# Patient Record
Sex: MALE | Race: WHITE | HISPANIC OR LATINO | URBAN - METROPOLITAN AREA
[De-identification: names, ages, dates, MRNs, and addresses within clinical notes are randomized per-mention and may not be internally consistent; named-entity substitution may affect disease eponyms.]

---

## 2019-04-08 ENCOUNTER — EMERGENCY (EMERGENCY)
Facility: HOSPITAL | Age: 34
LOS: 1 days | Discharge: HOME | End: 2019-04-08
Attending: EMERGENCY MEDICINE | Admitting: EMERGENCY MEDICINE
Payer: COMMERCIAL

## 2019-04-08 VITALS
SYSTOLIC BLOOD PRESSURE: 135 MMHG | TEMPERATURE: 98 F | OXYGEN SATURATION: 97 % | DIASTOLIC BLOOD PRESSURE: 107 MMHG | HEART RATE: 84 BPM | RESPIRATION RATE: 15 BRPM

## 2019-04-08 VITALS
HEIGHT: 71 IN | TEMPERATURE: 98 F | DIASTOLIC BLOOD PRESSURE: 84 MMHG | WEIGHT: 315 LBS | RESPIRATION RATE: 16 BRPM | SYSTOLIC BLOOD PRESSURE: 142 MMHG | HEART RATE: 90 BPM | OXYGEN SATURATION: 98 %

## 2019-04-08 LAB
ALBUMIN SERPL ELPH-MCNC: 4.2 G/DL — SIGNIFICANT CHANGE UP (ref 3.3–5)
ALP SERPL-CCNC: 66 U/L — SIGNIFICANT CHANGE UP (ref 40–120)
ALT FLD-CCNC: 116 U/L — HIGH (ref 4–41)
ANION GAP SERPL CALC-SCNC: 14 MMO/L — SIGNIFICANT CHANGE UP (ref 7–14)
AST SERPL-CCNC: 51 U/L — HIGH (ref 4–40)
BASOPHILS # BLD AUTO: 0.02 K/UL — SIGNIFICANT CHANGE UP (ref 0–0.2)
BASOPHILS NFR BLD AUTO: 0.2 % — SIGNIFICANT CHANGE UP (ref 0–2)
BILIRUB SERPL-MCNC: 0.5 MG/DL — SIGNIFICANT CHANGE UP (ref 0.2–1.2)
BUN SERPL-MCNC: 16 MG/DL — SIGNIFICANT CHANGE UP (ref 7–23)
CALCIUM SERPL-MCNC: 9.6 MG/DL — SIGNIFICANT CHANGE UP (ref 8.4–10.5)
CHLORIDE SERPL-SCNC: 103 MMOL/L — SIGNIFICANT CHANGE UP (ref 98–107)
CO2 SERPL-SCNC: 24 MMOL/L — SIGNIFICANT CHANGE UP (ref 22–31)
CREAT SERPL-MCNC: 0.91 MG/DL — SIGNIFICANT CHANGE UP (ref 0.5–1.3)
EOSINOPHIL # BLD AUTO: 0.15 K/UL — SIGNIFICANT CHANGE UP (ref 0–0.5)
EOSINOPHIL NFR BLD AUTO: 1.8 % — SIGNIFICANT CHANGE UP (ref 0–6)
GLUCOSE SERPL-MCNC: 99 MG/DL — SIGNIFICANT CHANGE UP (ref 70–99)
HCT VFR BLD CALC: 46.5 % — SIGNIFICANT CHANGE UP (ref 39–50)
HGB BLD-MCNC: 15.1 G/DL — SIGNIFICANT CHANGE UP (ref 13–17)
IMM GRANULOCYTES NFR BLD AUTO: 0.2 % — SIGNIFICANT CHANGE UP (ref 0–1.5)
LYMPHOCYTES # BLD AUTO: 2.83 K/UL — SIGNIFICANT CHANGE UP (ref 1–3.3)
LYMPHOCYTES # BLD AUTO: 33.4 % — SIGNIFICANT CHANGE UP (ref 13–44)
MCHC RBC-ENTMCNC: 27.1 PG — SIGNIFICANT CHANGE UP (ref 27–34)
MCHC RBC-ENTMCNC: 32.5 % — SIGNIFICANT CHANGE UP (ref 32–36)
MCV RBC AUTO: 83.5 FL — SIGNIFICANT CHANGE UP (ref 80–100)
MONOCYTES # BLD AUTO: 0.51 K/UL — SIGNIFICANT CHANGE UP (ref 0–0.9)
MONOCYTES NFR BLD AUTO: 6 % — SIGNIFICANT CHANGE UP (ref 2–14)
NEUTROPHILS # BLD AUTO: 4.95 K/UL — SIGNIFICANT CHANGE UP (ref 1.8–7.4)
NEUTROPHILS NFR BLD AUTO: 58.4 % — SIGNIFICANT CHANGE UP (ref 43–77)
NRBC # FLD: 0 K/UL — SIGNIFICANT CHANGE UP (ref 0–0)
PLATELET # BLD AUTO: 237 K/UL — SIGNIFICANT CHANGE UP (ref 150–400)
PMV BLD: 10.1 FL — SIGNIFICANT CHANGE UP (ref 7–13)
POTASSIUM SERPL-MCNC: 4.1 MMOL/L — SIGNIFICANT CHANGE UP (ref 3.5–5.3)
POTASSIUM SERPL-SCNC: 4.1 MMOL/L — SIGNIFICANT CHANGE UP (ref 3.5–5.3)
PROT SERPL-MCNC: 7.9 G/DL — SIGNIFICANT CHANGE UP (ref 6–8.3)
RBC # BLD: 5.57 M/UL — SIGNIFICANT CHANGE UP (ref 4.2–5.8)
RBC # FLD: 12.7 % — SIGNIFICANT CHANGE UP (ref 10.3–14.5)
SODIUM SERPL-SCNC: 141 MMOL/L — SIGNIFICANT CHANGE UP (ref 135–145)
TROPONIN T, HIGH SENSITIVITY: < 6 NG/L — SIGNIFICANT CHANGE UP (ref ?–14)
WBC # BLD: 8.48 K/UL — SIGNIFICANT CHANGE UP (ref 3.8–10.5)
WBC # FLD AUTO: 8.48 K/UL — SIGNIFICANT CHANGE UP (ref 3.8–10.5)

## 2019-04-08 PROCEDURE — 99284 EMERGENCY DEPT VISIT MOD MDM: CPT | Mod: 25

## 2019-04-08 PROCEDURE — 93010 ELECTROCARDIOGRAM REPORT: CPT | Mod: 59

## 2019-04-08 PROCEDURE — 73030 X-RAY EXAM OF SHOULDER: CPT | Mod: 26,LT

## 2019-04-08 PROCEDURE — 71046 X-RAY EXAM CHEST 2 VIEWS: CPT | Mod: 26

## 2019-04-08 RX ORDER — KETOROLAC TROMETHAMINE 30 MG/ML
15 SYRINGE (ML) INJECTION ONCE
Qty: 0 | Refills: 0 | Status: DISCONTINUED | OUTPATIENT
Start: 2019-04-08 | End: 2019-04-08

## 2019-04-08 RX ADMIN — Medication 15 MILLIGRAM(S): at 20:04

## 2019-04-08 NOTE — ED ADULT NURSE REASSESSMENT NOTE - NS ED NURSE REASSESS COMMENT FT1
Pt given Toradol for pain per MD orders.  Pain reassessed and pt states his pain is now at a 3/10. Will continue to monitor.

## 2019-04-08 NOTE — ED ADULT NURSE NOTE - NSIMPLEMENTINTERV_GEN_ALL_ED
Implemented All Universal Safety Interventions:  [place] to call system. Call bell, personal items and telephone within reach. Instruct patient to call for assistance. Room bathroom lighting operational. Non-slip footwear when patient is off stretcher. Physically safe environment: no spills, clutter or unnecessary equipment. Stretcher in lowest position, wheels locked, appropriate side rails in place.

## 2019-04-08 NOTE — ED PROVIDER NOTE - OBJECTIVE STATEMENT
34M presents to ED with left upper chest pain.  Pain has been constant since 2pm and radiates down left shoulder.  Patient describes pain as constant and dull in nature.  Patient reports he lifted something at home and felt a pop last week in his elbow and shoulder.  Pain from that resolved.  Patient denies dyspnea, nausea, vomiting, abdominal pain, fevers, chills, cough. 34M presents to ED with left upper chest pain.  Pain has been constant since 2pm and radiates down left shoulder.  Patient describes pain as constant and dull in nature.  Patient reports he lifted something at home and felt a pop last week in his elbow and shoulder.  Pain from that resolved.  Patient denies dyspnea, nausea, vomiting, abdominal pain, fevers, chills, cough. Pt states he has h/o one episode of A fib few years ago.

## 2019-04-08 NOTE — ED ADULT NURSE NOTE - OBJECTIVE STATEMENT
Pt received in room 15 AA&O X4. PMH of Afib. Comes in to ED today c/o pain originating in left arm and radiating to left shoulder and chest. Pt received in room 15 AA&O X4. PMH of Afib but has resolved. Comes in to ED today c/o pain originating in left arm and radiating to left shoulder, jaw and chest since 2PM today. Pt states he feels dizzy and has a headache. Pt denies any SOB at this time.  Pt hooked up to monitor. Labs collected. MD at bedside. Will continue to monitor. Pt received in room 15 AA&O X4. PMH of Afib but has resolved. Comes in to ED today c/o pain originating in left arm and radiating to left shoulder, jaw and chest since 2PM today. Pt states he feels dizzy and has a headache. Pt denies any SOB at this time. 20 G placed in RT hand. Pt hooked up to monitor. Labs collected. MD at bedside. Will continue to monitor.

## 2019-04-08 NOTE — ED PROVIDER NOTE - NSFOLLOWUPINSTRUCTIONS_ED_ALL_ED_FT
1) Please follow-up with your primary care doctor.  If you cannot follow-up with your doctor(s), please return to the ED for any urgent issues.  2) If you have any worsening of symptoms or any other concerns please return to the ED immediately.  3) Please continue taking your home medications as directed.  4) You may have been given a copy of your labs and/or imaging.  Please go over these with your primary care doctor.   5) Take 600mg Motrin (also called Advil or Ibuprofen) every 6 hours as needed for fever/pain/discomfort/swelling. You can take this with Tylenol 650 mg (also called acetaminophen) every 6 hours.   Don't use more than 3500mg of Tylenol in any 24-hour period. Make sure your other prescription/over-the-counter medications don't contain any Tylenol so you don't take too much.   If you have any stomach discomfort while taking Motrin, you can use TUMS or Pepcid or Zantac (these can all be bought without a prescription).

## 2019-04-08 NOTE — ED PROVIDER NOTE - ATTENDING CONTRIBUTION TO CARE
34M presents to ED with left upper chest pain.  Pain has been constant since 2pm and radiates down left shoulder.  Patient describes pain as constant and dull in nature.  Patient reports he lifted something at home and felt a pop last week in his elbow and shoulder.  Pain from that resolved.  Patient denies dyspnea, nausea, vomiting, abdominal pain, fevers, chills, cough. Pt states he has h/o one episode of A fib few years ago. On exam: VSS lungs, heart, pulses, abdomen, neuro, skin, all normal on exam. + tenderness to L upper arm muscles and L pectoralis which reproduces his presenting pain. EKG NSR No acute changes. IMP: Likely muscular L arm/CP Plan: Labs troponin NSAID, likely DC

## 2019-04-08 NOTE — ED ADULT TRIAGE NOTE - CHIEF COMPLAINT QUOTE
c/o reproducible left arm pain radiating into left upper chest, left neck and left jaw area along with feeling dizziness since 2pm today. Pt states 1 week ago pt did move something heavy and felt a "pop" in left shoulder but pain had gone away after a day. Hx Afib not on any anticoagulants.

## 2019-04-11 ENCOUNTER — OTHER (OUTPATIENT)
Age: 34
End: 2019-04-11

## 2019-04-11 ENCOUNTER — APPOINTMENT (OUTPATIENT)
Dept: ORTHOPEDIC SURGERY | Facility: CLINIC | Age: 34
End: 2019-04-11
Payer: COMMERCIAL

## 2019-04-11 VITALS
WEIGHT: 315 LBS | SYSTOLIC BLOOD PRESSURE: 144 MMHG | HEART RATE: 87 BPM | HEIGHT: 71 IN | DIASTOLIC BLOOD PRESSURE: 87 MMHG | BODY MASS INDEX: 44.1 KG/M2

## 2019-04-11 DIAGNOSIS — S46.212A STRAIN OF MUSCLE, FASCIA AND TENDON OF OTHER PARTS OF BICEPS, LEFT ARM, INITIAL ENCOUNTER: ICD-10-CM

## 2019-04-11 PROCEDURE — 99203 OFFICE O/P NEW LOW 30 MIN: CPT

## 2019-04-11 RX ORDER — NAPROXEN 500 MG/1
500 TABLET ORAL TWICE DAILY
Qty: 30 | Refills: 0 | Status: ACTIVE | COMMUNITY
Start: 2019-04-11 | End: 1900-01-01

## 2019-04-11 NOTE — PHYSICAL EXAM
[de-identified] : Images were reviewed from ER. \par \par Multiple views left shoulder showed no evidence of bony injury, or jason dislocation. There is no underlying degenerative arthritic change seen. Overall alignment is maintained. Otherwise unremarkable. [de-identified] : Oriented to time, place, person\par Mood: Normal\par Affect: Normal\par Appearance: Healthy, well appearing, no acute distress.\par Gait: Normal\par Assistive Devices: None\par \par Left upper extremity exam\par \par Inspection: No malalignment, No defects, No atrophy\par Skin: No masses, No lesions\par Neck: Negative Spurlings, full ROM, no pain with ROM\par AROM: Full shoulder/elbow range of motion \par Painful arc ROM: Pain with elbow extension\par Tenderness: Tender to palpation through the medial arm, intact biceps tendon, no significant deformity, minimal shoulder pain. Pain to the pectoralis insertion. No deformity. \par Strength: 4/5 ER, 4-/5 IR in adduction, 4+/5 supraspinatus testing, 4-/5 biceps, 5/5 triceps, difficulty with adduction \par AC Joint: No ttp/pain with cross arm testing\par Biceps: Speed +, Yergusons +\par Impingement test: Positive Bosch, Positive Neer \par Stability: Stable \par Vasc: 2+ radial pulse\par Neuro: AIN, PIN, Ulnar nerve in tact to motor\par Sensation: In tact to light touch throughout\par \par

## 2019-04-11 NOTE — DISCUSSION/SUMMARY
[de-identified] : 34-year-old male with left arm pain\par \par Patient reports pain radiating through the medial arm at the level of the biceps. There appears to be an intact distal biceps tendon clinically, with intact pectoralis tendon clinically. Concern for possible long head biceps tendon rupture with radiating symptoms. Patient has significant discomfort, so recommendation is for MRI imaging to determine if there is any significant additional injury.\par \par Patient was placed into a sling, prescribed naproxen b.i.d., recommend rest left upper extremity.\par \par Followup after MRI left upper extremity.

## 2019-04-11 NOTE — HISTORY OF PRESENT ILLNESS
[de-identified] : 34 year old RHD male, works for a [employer] presents today with acute left upper arm pain x 2 weeks. He felt a pop putting something on the table that was only 5lbs. He was evaluated at [facility] 2 days ago an x-rays were negative for fx. Pain is described to be sharp, constant worse lifting and typing. Localizes the pain to the medial arm from the elbow radiating up the arm into the shoulder/neck. Taking NSAIDs for pain relief. Reports constant numbness and tingling in the left arm. \par \par The patient's past medical history, past surgical history, medications and allergies were reviewed by me today with the patient and documented accordingly. In addition, the patient's family and social history, which were noncontributory to this visit, were reviewed also.\par

## 2019-04-17 ENCOUNTER — OTHER (OUTPATIENT)
Age: 34
End: 2019-04-17

## 2019-05-06 ENCOUNTER — APPOINTMENT (OUTPATIENT)
Dept: ORTHOPEDIC SURGERY | Facility: CLINIC | Age: 34
End: 2019-05-06

## 2019-05-08 ENCOUNTER — APPOINTMENT (OUTPATIENT)
Dept: ORTHOPEDIC SURGERY | Facility: CLINIC | Age: 34
End: 2019-05-08
Payer: COMMERCIAL

## 2019-05-08 PROCEDURE — 99213 OFFICE O/P EST LOW 20 MIN: CPT

## 2019-05-08 PROCEDURE — 73080 X-RAY EXAM OF ELBOW: CPT | Mod: LT

## 2019-05-10 ENCOUNTER — OTHER (OUTPATIENT)
Age: 34
End: 2019-05-10

## 2019-05-10 NOTE — HISTORY OF PRESENT ILLNESS
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 He felt a pop putting something on the table that was only 5lbs. He was evaluated at [facility] 2 days ago an x-rays were negative for fx. Pain is described to be sharp, constant worse lifting and typing. Localizes the pain to the medial arm from the elbow radiating up the arm into the shoulder/neck. Taking NSAIDs for pain relief. Reports constant numbness and tingling in the left arm. MRI of the arm obtained, but no evidence of biceps pathology. Presents to get XR of elbow prior to dedicated elbow MRI. \par

## 2019-05-10 NOTE — DISCUSSION/SUMMARY
[de-identified] : 34-year-old male with left arm pain\par \par Continued pain to the left arm. It does not appear to be any dysfunction to the biceps tendon distally, and prior concerns the long head biceps tendon rupture does not appear to be the case given the MRI findings. Continued difficulty, so recommendation has been for an MRI of the elbow to confirm no distal injury.\par \par Followup after MRI left elbow

## 2019-05-10 NOTE — PHYSICAL EXAM
[de-identified] : Oriented to time, place, person\par Mood: Normal\par Affect: Normal\par Appearance: Healthy, well appearing, no acute distress.\par Gait: Normal\par Assistive Devices: None\par \par Left upper extremity exam\par \par Inspection: No malalignment, No defects, No atrophy\par Skin: No masses, No lesions\par Neck: Negative Spurlings, full ROM, no pain with ROM\par AROM: Full shoulder/elbow range of motion \par Painful arc ROM: Pain with elbow extension\par Tenderness: Tender to palpation through the medial arm, intact biceps tendon, no significant deformity, minimal shoulder pain. Pain to the pectoralis insertion. No deformity. \par Strength: 4/5 ER, 4-/5 IR in adduction, 4+/5 supraspinatus testing, 4-/5 biceps, 5/5 triceps, difficulty with adduction \par AC Joint: No ttp/pain with cross arm testing\par Biceps: Speed +, [place]ons +\par Impingement test: Positive Bosch, Positive Neer \par Stability: Stable \par Vasc: 2+ radial pulse\par Neuro: AIN, PIN, Ulnar nerve in tact to motor\par Sensation: In tact to light touch throughout\par \par  [de-identified] : \par The following radiographs were ordered and read by me during this patients visit. I reviewed each radiograph in detail with the patient and discussed the findings as highlighted below. \par \par 3 views of the left elbow were obtained today that show no acute fracture or dislocation. There is no degenerative change seen. There is no gross malalignment. No significant other obvious osseous abnormality, otherwise unremarkable.\par \par Images were reviewed from ER. \par \par Multiple views left shoulder showed no evidence of bony injury, or jason dislocation. There is no underlying degenerative arthritic change seen. Overall alignment is maintained. Otherwise unremarkable.

## 2019-05-16 ENCOUNTER — OTHER (OUTPATIENT)
Age: 34
End: 2019-05-16

## 2019-05-20 ENCOUNTER — APPOINTMENT (OUTPATIENT)
Dept: ORTHOPEDIC SURGERY | Facility: CLINIC | Age: 34
End: 2019-05-20
Payer: COMMERCIAL

## 2019-05-20 VITALS
WEIGHT: 315 LBS | HEART RATE: 74 BPM | HEIGHT: 71 IN | DIASTOLIC BLOOD PRESSURE: 64 MMHG | BODY MASS INDEX: 44.1 KG/M2 | SYSTOLIC BLOOD PRESSURE: 126 MMHG

## 2019-05-20 DIAGNOSIS — M79.2 NEURALGIA AND NEURITIS, UNSPECIFIED: ICD-10-CM

## 2019-05-20 PROCEDURE — 99213 OFFICE O/P EST LOW 20 MIN: CPT

## 2019-05-20 NOTE — DISCUSSION/SUMMARY
[de-identified] : 34-year-old male with left arm pain\par \par Continued pain to the left upper extremity. The patient does not have any significant dysfunctional defect of the long head biceps tendon on MRI, and there does not appear to be any clinical suspicion for distal biceps rupture. Imaging of the upper extremity is limited from mid arm through elbow, so given constellation of symptoms the recommendation is for continued imaging studies including MRI of the left elbow to determine degree of internal derangement if any.\par \par Symptoms are improving at this time with limited function. Would recommend continued activity to tolerance.\par \par Followup after MRI at left elbow

## 2019-05-20 NOTE — PHYSICAL EXAM
[de-identified] : Oriented to time, place, person\par Mood: Normal\par Affect: Normal\par Appearance: Healthy, well appearing, no acute distress.\par Gait: Normal\par Assistive Devices: None\par \par Left upper extremity exam\par \par Inspection: No malalignment, No defects, No atrophy\par Skin: No masses, No lesions\par Neck: Negative Spurlings, full ROM, no pain with ROM\par AROM: Full shoulder/elbow range of motion \par Painful arc ROM: Pain with elbow extension\par Tenderness: Tender to palpation through the medial arm/biceps, intact distal biceps tendon, no significant deformity, no shoulder pain. Min pain to the pectoralis insertion. No deformity. \par Strength: 4+/5 ER, 4+/5 IR in adduction, 4+/5 supraspinatus testing, 4+/5 biceps, 5/5 triceps, min pain with sup/pro\par AC Joint: No ttp/pain with cross arm testing\par Biceps: Speed +, Yergusons neg\par Impingement test: Negative Bosch, Negative Neer \par Stability: Stable \par Vasc: 2+ radial pulse\par Neuro: AIN, PIN, Ulnar nerve in tact to motor\par Sensation: In tact to light touch throughout\par \par  [de-identified] : 3 views of the left elbow were obtained 4.11.19 that show no acute fracture or dislocation. There is no degenerative change seen. There is no gross malalignment. No significant other obvious osseous abnormality, otherwise unremarkable.\par \par Images were reviewed from ER. \par \par Multiple views left shoulder showed no evidence of bony injury, or jason dislocation. There is no underlying degenerative arthritic change seen. Overall alignment is maintained. Otherwise unremarkable.\par \par MRI left shoulder dated 4.23.19 shows no evidence of long head biceps or pectoralis dysfunction.

## 2019-05-20 NOTE — HISTORY OF PRESENT ILLNESS
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 Reports elbow pain has significantly improved since last visit, but still has limitation of function to the arm.  States that he has intermittent pain radiating to the medial biceps muscle.  He has pain with lifting. Taking Tylenol for pain relief. Reports now intermittent numbness and tingling in the left arm. MRI of the arm obtained, but no evidence of LH biceps/pectoralis pathology.\par

## 2019-05-24 ENCOUNTER — OTHER (OUTPATIENT)
Age: 34
End: 2019-05-24

## 2019-06-03 ENCOUNTER — APPOINTMENT (OUTPATIENT)
Dept: ORTHOPEDIC SURGERY | Facility: CLINIC | Age: 34
End: 2019-06-03

## 2023-06-16 ENCOUNTER — NON-APPOINTMENT (OUTPATIENT)
Age: 38
End: 2023-06-16

## 2024-01-30 ENCOUNTER — NON-APPOINTMENT (OUTPATIENT)
Age: 39
End: 2024-01-30

## 2024-12-26 ENCOUNTER — NON-APPOINTMENT (OUTPATIENT)
Age: 39
End: 2024-12-26

## 2025-02-07 ENCOUNTER — NON-APPOINTMENT (OUTPATIENT)
Age: 40
End: 2025-02-07